# Patient Record
(demographics unavailable — no encounter records)

---

## 2024-11-07 NOTE — HISTORY OF PRESENT ILLNESS
[FreeTextEntry1] : 80 yo female PMH: HTN, HLD. Pt was started on a low dose statin and had disabling muscle pain, repeated symptoms with RYR. Pt also had opthal visit and had the beginning of macular degeneration. She stopped HCTZ. She is only taking Synthroid. Pt has celiac disease. She denies chest pain or shortness of breath. She is not exercising.

## 2024-11-07 NOTE — DISCUSSION/SUMMARY
[Possible Cardiac Ischemia (Intermd Prob)] : possible cardiac ischemia (intermediate probability) [Hypertension] : hypertension [Stable] : stable [Patient] : the patient [FreeTextEntry1] : Pt presents today for reevaluation of chest pain.  Cardiac testing reviewed.  CTA reveals nonobstructive CAD.  Pt will exercise. She will start olmesartan 20 mg daily. She will follow up in a month [EKG obtained to assist in diagnosis and management of assessed problem(s)] : EKG obtained to assist in diagnosis and management of assessed problem(s)

## 2024-11-07 NOTE — CARDIOLOGY SUMMARY
[de-identified] : 5/2/24: RSR TWI 1, AVL, V5,V6 [de-identified] : 4/18/24:  Technically difficult image study, LVSFF appears grossly normal, EF 63% [de-identified] : CCTA 4/24/24  IMPRESSION: 1.  Nonobstructive CAD. 2.  The calculated Agatston score is 149. The estimated probability of a non zero calcium score is 89%.The observed calcium score is at percentile 54 for subjects of the same age, gender, and race/ethnicity who are free of clinical cardiovascular disease and treated diabetes. 3.  CAC-DRS Score: A2/N3 4.  There is mixed contrast opacification at the tip of the left atrial appendage (IKE), findings are suggestive of slow flow vs thrombus. Clinical correlation is suggested, consider RON if appropriate.

## 2024-12-09 NOTE — HISTORY OF PRESENT ILLNESS
[FreeTextEntry1] : 80 yo female PMH: HTN, HLD. Pt was started on a low dose statin and had disabling muscle pain, repeated symptoms with RYR. Pt also had opthal visit and had the beginning of macular degeneration. She is tolerating Olmesartan and had good control. Pt is complaining of chronic shortness of breath since her apartment was painted in September.

## 2024-12-09 NOTE — CARDIOLOGY SUMMARY
[de-identified] : 5/2/24: RSR TWI 1, AVL, V5,V6 [de-identified] : 4/18/24:  Technically difficult image study, LVSFF appears grossly normal, EF 63% [de-identified] : CCTA 4/24/24  IMPRESSION: 1.  Nonobstructive CAD. 2.  The calculated Agatston score is 149. The estimated probability of a non zero calcium score is 89%.The observed calcium score is at percentile 54 for subjects of the same age, gender, and race/ethnicity who are free of clinical cardiovascular disease and treated diabetes. 3.  CAC-DRS Score: A2/N3 4.  There is mixed contrast opacification at the tip of the left atrial appendage (IKE), findings are suggestive of slow flow vs thrombus. Clinical correlation is suggested, consider RON if appropriate.

## 2024-12-12 NOTE — ASSESSMENT
[FreeTextEntry1] : 81 years old with nonischemic cardiomyopathy prior history of sleep apnea comes with exertional dyspnea for last 6 months with panic and panting episodes..She had a pulmonary function test that her DLCO is severely reduced. She denies any history of DVT or PE and no recent long distance travel. Her Wells DVT score is -2 A previous CT scan of the chest was suspicious of ILD She reports history of sleep apnea which was never treated. Patient is obese and likely pulmonary hypertension.    ASSESSMENT:  1. Lung Scarring: The patient's pulmonary function test results were poor, with scarring noted on a CT scan. This scarring could be contributing to the patient's respiratory symptoms, such as shortness of breath and cough. Further evaluation with a dedicated lung scan is necessary to assess the extent of scarring and its impact on lung function.  2. Asthma Mimicry: AKA Reactive airwayGiven the patient's symptoms and history of paint exposure, the current respiratory issues might mimic asthma. This could be causing the patient's symptoms to present similarly to asthma without being true asthma. Further testing, including a CT scan and pulmonary function tests, is needed to confirm this.  PLAN:  Treatment: - Medications: Ordered Trelegy  and Prednisone 20 mg for 5 days.  Tests: - CT scan of the lungs to assess scarring and lung function. - Pulmonary function tests to evaluate the extent of respiratory compromise.  Patient Education: - Discussed the importance of monitoring oxygen levels at home, especially if levels drop below 90%. - Educated the patient on the potential side effects of prednisone and the necessity of taking it as prescribed.  Follow-Up: - Advised follow-up with the pulmonologist in one month for further evaluation and to review test results. - Instructed the patient to contact the office if there were concerns about oxygen levels or worsening symptoms.  Disposition: - The patient was advised to continue using Trieogy as directed and to complete the course of prednisone. - The patient was to arrange for a CT scan and follow up with Dr. Alcantara for an echocardiogram to ensure comprehensive cardiac and pulmonary evaluation.  Yes and desaturation walk was performed. Patient did the labs requiring distance around 180 to 200 m. No shortness of breath and no exertional desaturation and desaturation 93% exit saturation 92%. There was no significant increase in heart rate from

## 2024-12-12 NOTE — HISTORY OF PRESENT ILLNESS
[Never] : never [TextBox_4] : CHIEF COMPLAINT:  Pulmonary issues  PATIENT SUMMARY:  The patient presented with pulmonary issues.  HISTORY OF PRESENT ILLNESS:  The patient described experiencing symptoms that began after a trip to [facility] in April, with a single episode occurring at that time. The symptoms were severe in September, following exposure to paint. The patient reported an atypical sore throat, tinnitus, and being too ill to see a primary doctor, leading to a visit to urgent care. There, the patient was diagnosed with asthmatic bronchitis and prescribed prednisone and a Z-Maxwell, which was ineffective. A subsequent chest X-ray was normal. The patient's regular physician later prescribed Trilogy, which was helpful. Concerns about pulmonary function tests being poor were discussed, noting that a CT scan showed lung scarring. The patient had multiple visits to urgent care and was advised that the current pulmonary issues might mimic asthma, potentially due to exposure to paint. The patient was concerned about the cost of medications and was advised to follow up with a CT scan and other diagnostic tests to better understand the lung condition.  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 Negative for wheezing and hemoptysis. ENT: Positive for tinnitus and sore throat. Negative for nasal congestion and rhinorrhea.

## 2024-12-12 NOTE — HISTORY OF PRESENT ILLNESS
[Never] : never [TextBox_4] : CHIEF COMPLAINT:  Pulmonary issues  PATIENT SUMMARY:  The patient presented with pulmonary issues.  HISTORY OF PRESENT ILLNESS:  The patient described experiencing symptoms that began after a trip to [facility] in April, with a single episode occurring at that time. The symptoms were severe in September, following exposure to paint. The patient reported an atypical sore throat, tinnitus, and being too ill to see a primary doctor, leading to a visit to urgent care. There, the patient was diagnosed with asthmatic bronchitis and prescribed prednisone and a Z-Maxwell, which was ineffective. A subsequent chest X-ray was normal. The patient's regular physician later prescribed Trilogy, which was helpful. Concerns about pulmonary function tests being poor were discussed, noting that a CT scan showed lung scarring. The patient had multiple visits to urgent care and was advised that the current pulmonary issues might mimic asthma, potentially due to exposure to paint. The patient was concerned about the cost of medications and was advised to follow up with a CT scan and other diagnostic tests to better understand the lung condition.  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 Negative for wheezing and hemoptysis. ENT: Positive for tinnitus and sore throat. Negative for nasal congestion and rhinorrhea.

## 2024-12-12 NOTE — REASON FOR VISIT
[Acute] : an acute visit [Wheezing] : wheezing [Initial] : an initial visit [Shortness of Breath] : shortness of breath

## 2025-01-10 NOTE — HISTORY OF PRESENT ILLNESS
[FreeTextEntry1] : 80 yo female PMH: HTN, HLD. Pt was started on a low dose statin and had disabling muscle pain, repeated symptoms with RYR. Pt also had opthal visit and had the beginning of macular degeneration. She is tolerating Olmesartan and had good control. Pt was complaining of chronic shortness of breath since her apartment was painted in September. She was seen by pulmonology and treated with Trelegy. She states improvement in symptoms.  She presents today for follow up and echocardiogram, results of CTA cors. She is tolerating olmesartan with no side effects. She is somewhat anxious about results of testing.

## 2025-01-10 NOTE — PHYSICAL EXAM
[Well Developed] : well developed [Well Nourished] : well nourished [No Acute Distress] : no acute distress [Obese] : obese [Normal Conjunctiva] : normal conjunctiva [Normal Venous Pressure] : normal venous pressure [Clear Lung Fields] : clear lung fields [Soft] : abdomen soft [Normal Gait] : normal gait [Normal] : moves all extremities, no focal deficits, normal speech [Alert and Oriented] : alert and oriented [Normal memory] : normal memory

## 2025-01-10 NOTE — DISCUSSION/SUMMARY
[Hyperlipidemia] : hyperlipidemia [Diet Modification] : diet modification [Hypertension] : hypertension [Improving] : improving [None] : There are no changes in medication management [Weight Loss] : weight loss [Low Sodium Diet] : low sodium diet [Patient] : the patient [FreeTextEntry1] : 80 yo female with non-obstructive CAD, HTN and HLD. From a cardiac perspective she is feeling wel.  Her BP remains elevated, but she believes it is related to emotional stress and anxiety.  We did discuss increasing Olmesartan but she prefers to reassess with her PCP in March. She has been intolerant to multiple meds in the past including HCTZ and Norvasc, she willl review with PCP.    She has been seeing a dietician for wgt loss and cholesterol management and believes she has lost weight. She will continue therapy.  We again reviewd the results of CTA cors and echocardiogram. She will return to our office in June for reevaluation.

## 2025-01-10 NOTE — CARDIOLOGY SUMMARY
[de-identified] : 12/9/24: RSR TWI 1, AVL, V5,V6 [de-identified] : 1/10/25 opreliminary normal LV function, mild MR TR, technically difficult, lung interference body habitus [de-identified] : CCTA 4/24/24  IMPRESSION: 1.  Nonobstructive CAD. 2.  The calculated Agatston score is 149. The estimated probability of a non zero calcium score is 89%.The observed calcium score is at percentile 54 for subjects of the same age, gender, and race/ethnicity who are free of clinical cardiovascular disease and treated diabetes. 3.  CAC-DRS Score: A2/N3 4.  There is mixed contrast opacification at the tip of the left atrial appendage (IKE), findings are suggestive of slow flow vs thrombus. Clinical correlation is suggested, consider RON if appropriate.

## 2025-05-06 NOTE — ASSESSMENT
[Levothyroxine] : The patient was instructed to take Levothyroxine on an empty stomach, separate from vitamins, and wait at least 30 minutes before eating [FreeTextEntry1] : 82 year old female with PMH of HLD, CAD, Celiac disease, BMI 45, asthma, right TKR, stage 1 breast cancer s/p sentinal node removal, s/p lumpectomy, s/p radiation is presenting for bilateral adrenal nodules and hypothyroidism.   1.Hypothyroidism  Aug 2024 TSH 0.50 On Brand Synthroid 100mcg QD, C/w brand synthroid only Pt to repeat labs in 8 weeks in July 2025.   2. Bilateral Adrenal Nodules CT showed 1.9cm and 1.4cm adrenal nodules.  Pt is asymptomatic.  Will send for adrenal work up incl salivary cortisol.   Patient verbalized understanding of the above. All questions were answered to patient's satisfaction. Dispo: Patient will follow up in 8 weeks TTM. Follow up yearly.

## 2025-05-06 NOTE — REASON FOR VISIT
[Initial Evaluation] : an initial evaluation [Adrenal Evaluation/Adrenal Disorder] : adrenal evaluation/adrenal disorder [Hypothyroidism] : hypothyroidism

## 2025-05-06 NOTE — HISTORY OF PRESENT ILLNESS
[FreeTextEntry1] : Ms. Sams is presenting for bilateral adrenal nodules and hypothyroidism.   82 year old female with PMH of HLD, CAD, Celiac disease, BMI 45, asthma, right TKR, stage 1 breast cancer s/p sentinal node removal, s/p lumpectomy, s/p radiation.    #Hypothyroidism  Aug 2024 TSH 0.50 On Brand Synthroid 100mcg QD only brand works as patient's TSH levels were not improving 20 years ago.  Patient therefore was told she can only take brand synthroid.   #Adrenal Nodule Left adrenal gland nodule: 1.9cm  Right adrenal gland nodule: 1.4cm  Denies diaphoresis, visual disturbances, severe elevations in blood pressure, weight loss, palpitations, weight gain.

## 2025-05-06 NOTE — PHYSICAL EXAM
[Alert] : alert [Well Nourished] : well nourished [EOMI] : extra ocular movement intact [Normal Hearing] : hearing was normal [No Neck Mass] : no neck mass was observed [No Respiratory Distress] : no respiratory distress [Normal Rate] : heart rate was normal [Normal Gait] : normal gait [Normal Affect] : the affect was normal [Normal Mood] : the mood was normal

## 2025-05-29 NOTE — ASSESSMENT
[FreeTextEntry1] : 82F with bilateral leg swelling. Palpable pedal pulses on exam and no evidence of reflux on VI studies.  - Will measure for pneumatic lymphedema pumps - Compression and elevation for symptom management

## 2025-05-29 NOTE — HISTORY OF PRESENT ILLNESS
[FreeTextEntry1] : 82F presents with bilateral leg swelling which began on her R sided after knee replacement in 2022 and then recently started on her L side as well. She wears compression stockings. She denies claudication, rest pain or tissue loss.

## 2025-05-29 NOTE — PHYSICAL EXAM
[Normal Rate and Rhythm] : normal rate and rhythm [2+] : left 2+ [Ankle Swelling (On Exam)] : present [Ankle Swelling Bilaterally] : bilaterally  [Ankle Swelling On The Right] : mild [Varicose Veins Of Lower Extremities] : not present [] : not present [No Rash or Lesion] : No rash or lesion [Alert] : alert [Oriented to Person] : oriented to person [Oriented to Place] : oriented to place [Oriented to Time] : oriented to time [Calm] : calm [de-identified] : NAD [de-identified] : WNL

## 2025-06-27 NOTE — CARDIOLOGY SUMMARY
[de-identified] : 12/9/24: RSR TWI 1, AVL, V5,V6 [de-identified] : 1/10/25 opreliminary normal LV function, mild MR TR, technically difficult, lung interference body habitus [de-identified] : CCTA 4/24/24  IMPRESSION: 1.  Nonobstructive CAD. 2.  The calculated Agatston score is 149. The estimated probability of a non zero calcium score is 89%.The observed calcium score is at percentile 54 for subjects of the same age, gender, and race/ethnicity who are free of clinical cardiovascular disease and treated diabetes. 3.  CAC-DRS Score: A2/N3 4.  There is mixed contrast opacification at the tip of the left atrial appendage (IKE), findings are suggestive of slow flow vs thrombus. Clinical correlation is suggested, consider RON if appropriate.

## 2025-06-27 NOTE — DISCUSSION/SUMMARY
[Hyperlipidemia] : hyperlipidemia [Diet Modification] : diet modification [Hypertension] : hypertension [Improving] : improving [None] : There are no changes in medication management [Weight Loss] : weight loss [Low Sodium Diet] : low sodium diet [Patient] : the patient [FreeTextEntry1] : 83 yo female with non-obstructive CAD, HTN and HLD. From a cardiac perspective she is feeling well.  HTN: Pt's blood pressure is mildly elevated. Pt will check BP at home and bring cuff next visit.   She has been seeing a dietician for wgt loss and cholesterol management and believes she has lost weight. She will continue therapy.  We again reviewd the results of CTA cors and echocardiogram. She will return to our office in June for reevaluation. [EKG obtained to assist in diagnosis and management of assessed problem(s)] : EKG obtained to assist in diagnosis and management of assessed problem(s) No

## 2025-06-27 NOTE — HISTORY OF PRESENT ILLNESS
[FreeTextEntry1] : 83 yo female PMH: HTN, HLD. Pt was started on a low dose statin and had disabling muscle pain, repeated symptoms with RYR. Pt also had opthal visit and had the beginning of macular degeneration. She is tolerating Olmesartan and had good control. Pt was complaining of chronic shortness of breath since her apartment was painted in September. She was seen by pulmonology and treated with Trelegy. She states improvement in symptoms.  She presents today for follow up and echocardiogram, results of CTA cors. She is tolerating olmesartan with no side effects. She is somewhat anxious about results of testing.  Pt returns today for evaluation. She is feeling improved. Vascular and Endo notes were reviewed. Minimal weight loss.

## 2025-07-15 NOTE — HISTORY OF PRESENT ILLNESS
[Never] : never [TextBox_4] : PATIENT SUMMARY: The patient presented with respiratory symptoms including congestion, cough, and itchy eyes.  SUBJECTIVE: The patient reported experiencing congestion, cough, and itchy eyes since yesterday. The patient stated that breathing was manageable after walking earlier in the day. The patient had an inhaler but ran out and had not used it since that time, specifically mentioning not using albuterol or Advair. The patient did not report fever, chills, night sweats, or coughing up blood. The patient mentioned a previous diagnosis of hypothyroidism and ongoing treatment with Synthroid. The patient expressed concern about weight management, noting a weight loss of four pounds over six months. The patient stated no history of smoking and reported using a machine daily for one hour to address leg swelling, suspected to be a result of surgery.

## 2025-07-15 NOTE — COUNSELING
[Potential consequences of obesity discussed] : Potential consequences of obesity discussed [Benefits of weight loss discussed] : Benefits of weight loss discussed [Structured Weight Management Program suggested:] : Structured weight management program suggested [FreeTextEntry4] : 7

## 2025-07-15 NOTE — ASSESSMENT
[FreeTextEntry1] : 82 years old female, pleasant, non-smoker with prior history of COVID and asthma-like symptoms returns for follow-up. Patient is having increased irritation of eyes dry cough and some exertional dyspnea for last 1 to 2 days. No fever chills night sweats. No flulike symptoms. She had pulmonary function testing that have shown some improvement. A previous CT scan of the chest was reviewed that does not shows findings consistent with interstitial lung disease or suspicious lung nodule  ASSESSMENT: 1. Asthma: The patient displayed symptoms consistent with asthma, including cough and breathing difficulties. There was improvement in FEV1, lung capacity, and air exchange, suggesting a positive response to treatment.  2. Allergic Symptoms: The patient reported congestion and itchy eyes, indicative of allergic rhinitis. The plan included a short course of prednisone to address these symptoms. 3. Small subcentimeter lung nodules.   PLAN: Treatment: - Prescribed prednisone 10 mg daily for 3 days to manage allergy symptoms. - Recommended the continuation of the inhaler (Advair) with instructions to use two puffs twice daily and to gargle after use. - Advised using albuterol as needed for acute asthma symptoms. - Exercise and weight loss as tolerated. Continue to follow-up with cardiology for lymphedema and water retention Tests: - Scheduled a follow-up CT scan of the chest in six months to monitor a small cyst in the lung, And lung nodule  Patient Education: - Discussed the importance of weight management for improving lung function and overall health. - Instructed the patient to rest and perform breathing exercises before using albuterol, unless symptoms persist.  Follow-Up: - Planned follow-up visit in six months to reassess symptoms and treatment efficacy.